# Patient Record
Sex: FEMALE | Race: WHITE | Employment: FULL TIME | URBAN - METROPOLITAN AREA
[De-identification: names, ages, dates, MRNs, and addresses within clinical notes are randomized per-mention and may not be internally consistent; named-entity substitution may affect disease eponyms.]

---

## 2019-06-20 ENCOUNTER — OFFICE VISIT (OUTPATIENT)
Dept: ENT CLINIC | Age: 44
End: 2019-06-20
Payer: COMMERCIAL

## 2019-06-20 VITALS — SYSTOLIC BLOOD PRESSURE: 128 MMHG | DIASTOLIC BLOOD PRESSURE: 62 MMHG | HEART RATE: 73 BPM | OXYGEN SATURATION: 98 %

## 2019-06-20 DIAGNOSIS — H93.13 TINNITUS OF BOTH EARS: ICD-10-CM

## 2019-06-20 DIAGNOSIS — H69.92 DISORDER OF LEFT EUSTACHIAN TUBE: Primary | ICD-10-CM

## 2019-06-20 PROCEDURE — 99203 OFFICE O/P NEW LOW 30 MIN: CPT | Performed by: OTOLARYNGOLOGY

## 2019-06-20 RX ORDER — METHYLPREDNISOLONE 4 MG/1
4 TABLET ORAL SEE ADMIN INSTRUCTIONS
Qty: 1 KIT | Refills: 0 | Status: SHIPPED | OUTPATIENT
Start: 2019-06-20 | End: 2019-12-02

## 2019-06-20 RX ORDER — GUAIFENESIN 600 MG/1
1200 TABLET, EXTENDED RELEASE ORAL 2 TIMES DAILY
COMMUNITY

## 2019-06-20 RX ORDER — FLUTICASONE PROPIONATE 50 MCG
1 SPRAY, SUSPENSION (ML) NASAL DAILY
COMMUNITY

## 2019-06-20 RX ORDER — METHENAMINE, SODIUM PHOSPHATE, MONOBASIC, METHYLENE BLUE, AND HYOSCYAMINE SULFATE 81.6; 40.8; 10.8; .12 MG/1; MG/1; MG/1; MG/1
TABLET, COATED ORAL
COMMUNITY

## 2019-06-20 ASSESSMENT — ENCOUNTER SYMPTOMS
EYES NEGATIVE: 1
TROUBLE SWALLOWING: 0
ALLERGIC/IMMUNOLOGIC NEGATIVE: 1
RHINORRHEA: 1
VOICE CHANGE: 0
SINUS PAIN: 0
SINUS PRESSURE: 0
RESPIRATORY NEGATIVE: 1
FACIAL SWELLING: 0
SORE THROAT: 0

## 2019-06-20 NOTE — PROGRESS NOTES
SUBJECTIVE:    Chief Complaint   Patient presents with    Tinnitus     Both ears had a very loud buzzing noise in them. Left ear is now ringing. Right ear is fine. Julito Reyna is a 37 y.o. female    Patient related that 3 weeks ago she noted a sudden onset of tinnitus in both of her ears along with a sensation of fluid present. She had noted that this occurred after she was exposed to some noise helping her father and also onset of use of amitriptyline. She had no prior history of similar problem. She saw her PCP who prescribed a trial of Mucinex along with Flonase and changed her amitriptyline. She noted some slight improvement in her right ear but continued to notice a popping sensation in the left ear as well as congestion and possible to some decrease in hearing. She has had also an increase in congestion in her nose. She did relate a fracture of her nose that was repaired in 1994 but had no problems particularly following that. She has had some allergy symptoms that seem to open present primarily in the spring and the fall. There is no family history of ear problems and no history of noise exposure other than using a lawnmower for which she does wear protection. She has had no history of significant head trauma. She does not smoke. She is on no other medications that would likely precipitate similar problems. No fever is been noted. No past medical history on file. No past surgical history on file. No family history on file. Social History     Tobacco Use    Smoking status: Never Smoker    Smokeless tobacco: Never Used   Substance Use Topics    Alcohol use: Not on file        Review of Systems:  Review of Systems   Constitutional: Negative. Negative for fever. HENT: Positive for hearing loss, postnasal drip, rhinorrhea and tinnitus.  Negative for congestion, dental problem, drooling, ear discharge, ear pain, facial swelling, mouth sores, nosebleeds, sinus pressure, sinus pain, sneezing, sore throat, trouble swallowing and voice change. Eyes: Negative. Respiratory: Negative. Cardiovascular: Negative. Endocrine: Negative. Skin: Negative. Allergic/Immunologic: Negative. Negative for environmental allergies. Neurological: Negative. Negative for dizziness and light-headedness. Hematological: Negative. Psychiatric/Behavioral: Negative. OBJECTIVE:  /62   Pulse 73   SpO2 98%   Physical Exam   Constitutional: She is oriented to person, place, and time. She appears well-developed and well-nourished. HENT:   Head: Normocephalic and atraumatic. Right Ear: External ear normal.   Left Ear: External ear normal.   Mouth/Throat: Oropharynx is clear and moist. No oropharyngeal exudate. Indirect laryngoscopy is unremarkable. Nasal mucosa treated with cotton pledgets  impregnated with Afrin and lidocaine placed in middle meatuses against turbinates. Pledgets left in place for five minutes and removed enabling enhanced visualization. The exam revealed positive edema of mucosa. it was negative for erythema indicative of infection. There was not evidence of deviation of the septum which was not significant. There were not polyps present. .There were not other masses present. The turbinates were not enlarged beyond normal.  Tuning fork studies indicate normal hearing. Eyes: Pupils are equal, round, and reactive to light. Conjunctivae and EOM are normal.   Neck: Normal range of motion. Neck supple. No tracheal deviation present. No thyromegaly present. Cardiovascular: Normal rate and regular rhythm. Pulmonary/Chest: Effort normal.   Lymphadenopathy:     She has no cervical adenopathy. Neurological: She is alert and oriented to person, place, and time. Skin: Skin is warm and dry. Psychiatric: She has a normal mood and affect.  Her behavior is normal. Judgment and thought content normal.        ASSESSMENT:    At present, findings are more indicative of eustachian tube dysfunction primarily on the left side. However it is possible that noise could have induced the initial onset of tenderness. I do not feel that the amitriptyline was playing a role. PLAN:     I have described the situation carefully to the patient. She is going to be traveling by air in a week. Therefore, we will add a Medrol Dosepak to the continued use of Flonase nasal spray.     Kan Haddad MD

## 2019-06-25 ENCOUNTER — TELEPHONE (OUTPATIENT)
Dept: ENT CLINIC | Age: 44
End: 2019-06-25

## 2019-06-25 DIAGNOSIS — H69.92 DISORDER OF LEFT EUSTACHIAN TUBE: Primary | ICD-10-CM

## 2019-06-25 RX ORDER — PREDNISONE 10 MG/1
TABLET ORAL
Qty: 25 TABLET | Refills: 0 | Status: SHIPPED | OUTPATIENT
Start: 2019-06-25 | End: 2019-12-02

## 2019-06-25 RX ORDER — AZITHROMYCIN 250 MG/1
TABLET, FILM COATED ORAL
Qty: 1 PACKET | Refills: 0 | Status: SHIPPED | OUTPATIENT
Start: 2019-06-25 | End: 2019-12-02

## 2019-06-25 NOTE — TELEPHONE ENCOUNTER
Has been on predisone for 6 days, ringing still in left ear, please advise. Leaving on vacation Thursday, flying.

## 2019-12-02 ENCOUNTER — OFFICE VISIT (OUTPATIENT)
Dept: ENT CLINIC | Age: 44
End: 2019-12-02
Payer: COMMERCIAL

## 2019-12-02 VITALS — HEART RATE: 81 BPM | SYSTOLIC BLOOD PRESSURE: 118 MMHG | OXYGEN SATURATION: 97 % | DIASTOLIC BLOOD PRESSURE: 72 MMHG

## 2019-12-02 DIAGNOSIS — H93.13 TINNITUS OF BOTH EARS: Primary | ICD-10-CM

## 2019-12-02 DIAGNOSIS — J30.9 ALLERGIC SINUSITIS: ICD-10-CM

## 2019-12-02 PROCEDURE — 99213 OFFICE O/P EST LOW 20 MIN: CPT | Performed by: OTOLARYNGOLOGY

## 2019-12-02 RX ORDER — MONTELUKAST SODIUM 10 MG/1
10 TABLET ORAL NIGHTLY
Qty: 15 TABLET | Refills: 1 | Status: SHIPPED | OUTPATIENT
Start: 2019-12-02

## 2019-12-06 ENCOUNTER — PROCEDURE VISIT (OUTPATIENT)
Dept: AUDIOLOGY | Age: 44
End: 2019-12-06
Payer: COMMERCIAL

## 2019-12-06 DIAGNOSIS — H93.13 TINNITUS OF BOTH EARS: Primary | ICD-10-CM

## 2019-12-06 PROCEDURE — 92557 COMPREHENSIVE HEARING TEST: CPT | Performed by: AUDIOLOGIST

## 2019-12-08 ENCOUNTER — TELEPHONE (OUTPATIENT)
Dept: OTOLARYNGOLOGY | Age: 44
End: 2019-12-08